# Patient Record
Sex: MALE | Race: WHITE | NOT HISPANIC OR LATINO | Employment: OTHER | URBAN - METROPOLITAN AREA
[De-identification: names, ages, dates, MRNs, and addresses within clinical notes are randomized per-mention and may not be internally consistent; named-entity substitution may affect disease eponyms.]

---

## 2018-08-27 ENCOUNTER — TELEPHONE (OUTPATIENT)
Dept: GASTROENTEROLOGY | Facility: CLINIC | Age: 68
End: 2018-08-27

## 2018-08-27 NOTE — TELEPHONE ENCOUNTER
----- Message from Salma Wallerkert sent at 8/27/2018  9:31 AM CDT -----  Contact: esther/dr lara - [phone]  Brenda - did  You receive the records they sent - please call esther/dr lara - [phone]

## 2018-08-28 ENCOUNTER — TELEPHONE (OUTPATIENT)
Dept: GASTROENTEROLOGY | Facility: CLINIC | Age: 68
End: 2018-08-28

## 2018-08-28 NOTE — TELEPHONE ENCOUNTER
----- Message from Marichuy Bhat sent at 8/28/2018  2:58 PM CDT -----  Contact: [facility] Dr HERMINIA Rivera's cover sheet has been scanned into media mgr.     Thank you for your assistance scheduling appt.     Sincerely,   [facility]   [id number]   ----- Message -----  From: Nicole Soto RN  Sent: 8/22/2018   2:19 PM  To: Marichuy Benedict,    Can you please request that Dr. Garcia's office fill out Dr. David Gutierrez's referral form? In the meantime I will discuss this referral with Dr. Gutierrez.    Thanks!  -Nicole      ----- Message -----  From: Marichuy Bhat  Sent: 8/22/2018  12:57 PM  To: Brenda Hernandez Staff    Hi Dr Gutierrez and  Staff,     Dr PIEDRA. Hu Garcia was referring this pt to Dr SHA Carter for dx Crohns.     Dr Carter states we should redirect this pt to you.     The refrl and records are in media.     Can you plz review and assist with appt?     Thank you,  [facility]  [facility]   [id number]

## 2018-08-31 ENCOUNTER — TELEPHONE (OUTPATIENT)
Dept: GASTROENTEROLOGY | Facility: CLINIC | Age: 68
End: 2018-08-31

## 2018-08-31 NOTE — TELEPHONE ENCOUNTER
----- Message from Cookie Fox sent at 8/31/2018  1:19 PM CDT -----  Contact: Chel Stratton,  [phone]  Patient Returning Call from [facility]    Who Left Message for Patient: Nicole  Communication Preference: Chel Stratton,  [phone]  Additional Information:

## 2018-08-31 NOTE — TELEPHONE ENCOUNTER
----- Message from Yuli Schwartz MA sent at 8/31/2018  3:54 PM CDT -----  Contact: [phone]  Patient Returning Call from [facility]    Who Left Message for Patient: Nicole  Communication Preference: [phone]  Additional Information: regarding appt access with Dr Gutierrez (referral)

## 2018-09-05 ENCOUNTER — TELEPHONE (OUTPATIENT)
Dept: GASTROENTEROLOGY | Facility: CLINIC | Age: 68
End: 2018-09-05

## 2018-09-06 NOTE — TELEPHONE ENCOUNTER
Pt is scheduled for a new patient clinic appt with Dr. ZOILA Gutierrez on 9/10/2018.    E-mail sent to pt with appt reminder, new patient welcome letter as well as a campus map.    Referring MD notified of appt.

## 2018-09-06 NOTE — PROGRESS NOTES
[facility]          Inflammatory Bowel Disease New Patient Consultation Note         TODAY'S VISIT DATE:  9/6/2018    Reason for Consult:    Chief Complaint   Patient presents with    Crohn's Disease     PCP: No primary care provider on file.      Referring MD:   Dr. TED Garcia    History of Present Illness:    Dear. Dr. TED Garcia    Thank you for requesting a consultation on your patient Soy Stratton who is a 68 y.o. male seen today at the [facility] on 09/06/2018 for inflammatory bowel disease- Crohn's disease.  Pertinent past medical history includes family history of Crohn's disease, IBS, GERD, H pylori infection (treated 1999), chronic pain syndrome, WILMA, diverticulosis, depression, fibromyalgia, nephrolithiasis, shingles, asthma (diagnosed 11/2017), Reveal cardiac monitor (placed 10/2016).    As you know, Soy Stratton was doing well until July 1997 when he reported bloating, fatigue, abdominal cramping and loose stool. Levsin and prevacid were not effective GB US 1/1998 was normal.  He then underwent EGD and colonoscopy on 6/2/1999.  EGD report not available but biopsies of the stomach revealed chronic superficial gastritis with rare equivocal H pylori.  Colonoscopy showed sigmoid diverticulosis with remainder of the colon normal (no biposies available).  H pylori treatment seemed to help his symptoms.  At that time he was diagnosed with IBS and prevacid seemed to help GERD symptoms and Paxil helped with IBS symptoms.  In 11/1999 he had nausea/bloating, abdominal cramping. Bentyl was started and helped.  He continued to feel well.  In 7/2008 he was seen for epigastric discomfort and nausea. EGD in 12/2008 was normal.  In Jun 2010 patient had severe abdominal pain and treated with cipro/flagyl for presumed diverticulitis. In 12/2010 he was seen by Dr. Valencia for chronic pain syndrome and was diagnosed with fibromyalgia and given zanaflex.    "In 6/2011 patient reported abdominal pain and indigestion treated with prevacid with no improvement.  EGD and colonoscopy were done 6/29/11 which showed normal EGD and colonoscopy showed normal colon with biopsies of ascending and descending colon normal.  Moderate diverticulosis of the sigmoid and descending colon.  Ulceration in the terminal ileum with biopsies consistent with "moderate to severe severity." At his f/u visit on 7/5/11 after the colonoscopy he was started on pentasa with a plan for VCE and possible biologics in the future. In 8/2011 patient underwent VCE which showed 2 short segments of granularity/erythema visualized in the distal ileum and distal jejunum vs proximal ileum consistent with CD.  In approximately late 10/2011 he was started on budesonide which improved and continued this for 2 additional weeks.  The plan at that visit was to continued pentasa and consider low dose budesonide long term.  In approximately 3/2012 he discontinued budesonide.  He started imuran 100 mg po daily in early 2/2012 and reported constipation/bloating. He again had skin issues treated including seborrheic keratosis and irritated intradermal nevus.  By 2012 symptoms stable on imuran and pentasa though at his visit in 7/2012 his imuran was increased to 150 mg po daily.  In 10/2012 he reported RUQ/right flank pain for 2 weeks and after workup due to biliary dyskinesia underwent a cholecystectomy with improvement of pain and nausea.  He was hopstilized in 2/2013 for about 3 days due to hematochezia and hospitalized in the ICU. He was transfused 2 units of PRBC.  Colonoscopy during this hospitalization revealed sigmoid and descending colon diverticulosis and TI ulcers.  It was felt that the patient had LGIB related to diverticular bleed. At his f/u after hospitalization on 3/7/13 he planned on thiopurine metabolites, stopped pentasa (too expensive), start flagyl.  On 5/7/13 he had kidney stone and at that time notes " indicate he was on levsin and continuing on imuran 200 mg po daily.  In 8/2013 patient was feeling well on imuran.  In 10/2013 he had recurrent RUQ abdominal pain radiating to his flank which started after starting crestor.  In 12/2013 patient developed URI and treated with corticosteroids and ceftin.  In 2/2014 patient had coughing so underwent Cxray which was normal and CT abd/pelvis showing diverticulosis and thickening of the TI.  He also that month complained of joint and muscle pain.  Colonoscopy 3/2014 (endo report not available) showed path with active CD of the small intestine.  Patient started remicade in approximately spring 2014 and remained on this along with imuran for at least a year.  In 9/2014 EGD was essentially except for benign fundic gland polyps. In 3/2015 patient had bronchitis and multiple rounds of antibiotics and had intermittent diarrhea.  Patient had active shingles while on remicade. Due to recurrent infections remicade was discontinued sometime in 2015.  He had some erratic bowels after receiving antibiotics for UTI in 12/2015 and probiotics seemed to help.  In  4/2016 he had nausea and decreased appetite and underwent an EGD which showed diffuse granularity and erythema in the entire stomach with biopsies showing reactive gastropathy and benign fundic gland polyps.  He had therapeutic thiopurine metabolites on imuran.  Colonoscopy in 7/2016 showed multiple irregular ulcers in the terminal ileum with biopsies consistent with acute and chronic inflammation.  In summer 2016 patient was started on cimzia and his imuran by 10/2016 was decreased from 150 mg po daily to 100 mg po daily. On 10/7/16 a Reveal cardiac monitor was placed due to abnormal EP study. By Nov/Dec 2016 he had ongoing nausea/bloating, joint pain.  He was on vitamin B12 supplements, prevacid BID, carafate ineffective and continued on cimzia 400 mg SC monthly and imuran 100 mg po daily. He recalls being on imuran 200 mg  "daily and his cardiologist was convinced the imuran was the issue and so his imuran was decreased to 100 mg daily.   On 2/8/18 he had cimzia levels done which were 8.3 with no abs.  Colonoscopy 2/12/18 showed a single 5 mm ulcer in the TI and few shallow ulcers with biopsies showing severely active chronic enteritis; extensive mucosal ulceration with granulation tissue; numerous non-caseating granulomas present.  At his visit in 3/2018 due to low cimzia levels his GI doctor recommended increasing dose vs switching drug.  In 4/2018 he started entyvio and reported at his visit 6/2018 that he joint aches and stuffy nose though entyvio was continued.  Labs on 6/29/18 showed normal CBC, , normal CMP.     Currently patient reports 1-2 formed BMs/day with no blood, nocturnal bowel movements.  He reports "feverish" at times but no fevers, occasional chills.  He has had 10 pound weight loss in last 4-6 mos and he attributes this to "stomach feels irritated."  He has eye redness bilaterally. He has ROBB due to more strenuous exertion (02 sats normal, no calf pain or swelling, no chest pain). He reports umbilical 3/10 abdominal pain (tender). About 3 weeks had some diarrhea which resolved on its own and lomotil helped. He has had some "abdominal swelling" since then.  He has anxiety/depression.  He has generalized joint pain and back pain. Since starting entyvio in 4/2018 he had some URI and now these symptoms are getting less intense with time. Getting some improvement of energy levels though this has improved with time but he feels this started when he started the entyvio. He is currently on entyvio 300 mg IV every 8 weeks (last dose 7/27/18, next dose 9/21/18) and imuran 100 mg po daily. Patient has no other gastrointestinal/constitutional complaints including no nausea/vomiting (including no hematemesis), dysphagia.  Also patient does not have any extraintestinal manifestations of their inflammatory bowel disease " including no eye pain/redness, skin lesions/rashes, oral ulcers.    Prior Pertinent Surgeries:   3/22/2011: Skin biopsy (right sub scap back: hyperkeratosis, parakeratosis, papillomatosis, acanthosis, and horn cysts; lesion is heavily pigmented--pigmented seborrheic keratosis)  2012 skin biopsy: (irritated intradermal nevus: nested intradermal nevus cells embedded in edematous stroma)    Pertinent Endoscopy/Imagin1998 gallbladder US: negative  1999 EGD: Path only (antrum: focal chronic superficial gastritis with rare equivocal H. pylori organisms)  1999 Colonoscopy: diverticulosis of the sigmoid of the colon with the remainder of the colon appearing normal (no biopsies)  12/15/2008 EGD: normal (no biopsies)  2011 EGD: normal (no biopsies)  2011 Colonoscopy: colonic mucosa reveals normal appearing colonic mucosa throughout the exam, moderate diverticulosis in the sigmoid and descending colon; ulceration in the terminal ileum (Path: Crohns disease, moderate to severe in severity) (path-ascending & descending: normal)  8/3/2011 VCE: 2 short segments of granularity/erythema visualized in the distal ileum and distal jejunum vs proximal ileum consistent with CD  2014 CXR: normal   2014 CT Abd/pelvis: thickening of the terminal ileum; diverticulosis coli  3/10/2014 Colonoscopy: (path: active CD of the small intestine)  9/3/2014 EGD: normal esophagus; protruding lesions, several sessile non-bleeding polyps ranging from 3-5 mm in the fundus consistent with benign fundic gland polyps (path: fundal polyp glands); normal duodenum   2016 EGD: diffuse granularity and erythema of the mucosa in the whole stomach (path: prominent chemical/reactive gastropathy, negative H. pylori);  several non-bleeding polyps ranging from 2mm-1cm in the stomach body and fundus consistent with benign fundic gland polyps (path: benign fundic gland polyp)  2016 Colonoscopy: multiple irregular ulcers  ranging from 2-5mm found in the TI (path: active ileitis with mucosal erosion, no granulomas)  2/12/2018 Colonoscopy: a single deep 5 mm ulcer found in the TI; few shallow ulcers ranging from 1-2 mm in size in the TI; aphthous ulcers SES-CD score of 4 (path: severely active chronic enteritis; extensive mucosal ulceration with granulation tissue; numerous non-caseating granulomas present)    Pertinent Labs:  6/29/2018: WBC 7.7, RBC 4.74, Hgb 16.5, Hct 47.6,  (79-97), Platelets 174, BUN 20, creatinine 1.15, albumin 4.5, t bili 0.9. alk phos 65, AST 28, ALT 24    Therapeutic Drug Monitoring Labs:  2/8/2018 Cimzia level 8.3, no ABs    Prior IBD Therapies:  Pentasa 4 gm/day  Budesonide   Imuran 2/2012  Cipro  Flagyl  Remicade 4066-3644  Cimzia 8/20160215-4726    Current IBD Therapies:  Entyvio 300 mg IV every 8 weeks   Imuran 100 mg po daily    Vaccinations:  Influenza (inactive): Fall 2017  Pneumococcal PCV 13: recommended  Pneumococcal PCV 23: 2013  Tetanus (TdaP): not sure  HPV (males and females ages 19-25 yo): NA   Meningococcal (risk factors-complement component deficiency, spleen damage or splenectomy, HIV, traveling to endemic areas, college student residing in residence [place],  recruits): NA  Hepatitis B:  Not sure  No results found for: HEPBSAB  Hepatitis A (risk factors- traveling to high endemic areas, chronic liver disease, clotting factor disorders, MSM, illicit drug users): Not sure  No results found for: HEPAIGG  MMR (live vaccine): Not sure  Chickenpox status/Varicella (live vaccine): Had chickenpox as a child  No results found for: VARICELLAZOS, VARICELLAINT  Zoster (age >49 yo, live vaccine): Had shingles (2015); Shingrix recommended    NSAID use/indication:  Aleve once a month, aches/pains    Narcotic use:  No    Alternative/Complementary Meds for IBD:  No    Review of Systems   Constitutional: Positive for chills and fever. Negative for weight loss.   Eyes: Positive for blurred vision, pain  and redness.   Respiratory: Positive for shortness of breath. Negative for cough.    Cardiovascular: Negative for chest pain.   Gastrointestinal: Positive for abdominal pain. Negative for heartburn, nausea and vomiting.   Genitourinary: Negative for hematuria.   Musculoskeletal: Positive for back pain and joint pain.   Skin: Negative for rash.   Psychiatric/Behavioral: Positive for depression. The patient is nervous/anxious.      Medical/Surgical History:    has a past medical history of Arrhythmia, Asthma, Cholelithiasis, Chronic pain syndrome, Colon polyp, Crohn's disease, Depression, Diverticulosis, Fibromyalgia, GERD (gastroesophageal reflux disease), H. pylori infection, HTN (hypertension), Irritable bowel syndrome, Nephrolithiasis, WILMA (obstructive sleep apnea), and Shingles.   has a past surgical history that includes Cholecystectomy; Appendectomy; Sinus surgery (2003); Anterior cruciate ligament repair (2005); Carpal tunnel release; Trigger finger release; and Kidney surgery.    Family History:   family history includes Crohn's disease in his brother, mother, and sister; Inflammatory bowel disease in his brother, mother, and sister.    Social History:    reports that  has never smoked. he has never used smokeless tobacco. He reports that he drinks alcohol. He reports that he uses drugs. Drug: Marijuana.    Review of patient's allergies indicates:  Allergies not on file    Current Medications:   Outpatient Medications Marked as Taking for the 9/10/18 encounter (Office Visit) with David Gutierrez MD   Medication Sig Dispense Refill    acetaminophen (TYLENOL) 650 MG TbSR Take 650 mg by mouth every 8 (eight) hours.      albuterol (PROVENTIL) 2.5 mg /3 mL (0.083 %) nebulizer solution Take 2.5 mg by nebulization 2 (two) times daily as needed for Wheezing. Rescue      albuterol sulfate (VENTOLIN INHL) Inhale 2 puffs into the lungs 2 (two) times daily.      ascorbic acid/multivit-min (EMERGEN-C ORAL) Take 3  Packages by mouth once daily.      aspirin (ECOTRIN) 81 MG EC tablet Take 81 mg by mouth once daily.      azaTHIOprine (IMURAN) 50 mg Tab Take 100 mg by mouth once daily.      azelastine 0.15 % (205.5 mcg) Spry 2 sprays by Nasal route 2 (two) times daily.      busPIRone (BUSPAR) 15 MG tablet Take 15 mg by mouth every evening.      busPIRone (BUSPAR) 7.5 MG tablet Take 7.5 mg by mouth every morning.      calcium citrate-vitamin D3 315-200 mg (CITRACAL+D) 315-200 mg-unit per tablet Take 1 tablet by mouth 2 (two) times daily.      cetirizine (ZYRTEC) 10 MG tablet Take 10 mg by mouth once daily.      cyanocobalamin 1,000 mcg/mL injection Inject 1,000 mcg into the muscle every 30 days.      diltiaZEM (TIAZAC) 180 MG Cs24 Take 180 mg by mouth once daily.      diphenoxylate-atropine 2.5-0.025 mg (LOMOTIL) 2.5-0.025 mg per tablet Take 1 tablet by mouth 4 (four) times daily as needed for Diarrhea.      fluticasone (FLONASE) 50 mcg/actuation nasal spray 1 spray by Each Nare route once daily.      fluticasone (FLOVENT HFA) 110 mcg/actuation inhaler Inhale 2 puffs into the lungs 2 (two) times daily. Controller      fluticasone furoate (ARNUITY ELLIPTA) 100 mcg/actuation DsDv Inhale 1 puff into the lungs once daily. Controller      hyoscyamine (ANASPAZ,LEVSIN) 0.125 mg Tab Take 125 mcg by mouth every 4 (four) hours as needed.      ipratropium (ATROVENT) 42 mcg (0.06 %) nasal spray 2 sprays by Nasal route 4 (four) times daily.      Lactobac no.41/Bifidobact no.7 (PROBIOTIC-10 ORAL) Take 1 Dose by mouth once daily.      loratadine (CLARITIN) 5 mg chewable tablet Take 10 mg by mouth once daily.      LORazepam (ATIVAN) 1 MG tablet Take 1 mg by mouth 2 (two) times daily as needed for Anxiety.      losartan (COZAAR) 50 MG tablet Take 50 mg by mouth once daily.      montelukast (SINGULAIR) 10 mg tablet Take 10 mg by mouth once daily.      naproxen sodium (ANAPROX) 220 MG tablet Take 440 mg by mouth 2 (two) times  "daily with meals.      omeprazole (PRILOSEC) 20 MG capsule Take 20 mg by mouth 2 (two) times daily.      PEPPERMINT OIL MISC 1 tablet by Misc.(Non-Drug; Combo Route) route before meals as needed.      senna (SENOKOT) 8.6 mg tablet Take 1 tablet by mouth once daily.      sildenafil (VIAGRA) 25 MG tablet Take 100 mg by mouth daily as needed for Erectile Dysfunction.      SLIPPERY ELM BARK ORAL Take 400 mg by mouth 3 (three) times daily before meals.      testosterone cypionate (DEPOTESTOTERONE CYPIONATE) 100 mg/mL injection Inject 200 mg into the muscle every 14 (fourteen) days.      UNABLE TO FIND Take 1 tablet by mouth 2 (two) times daily. medication name: Fibralgia    Mg Malate 80 mg  Malic Acid 350 mg      vedolizumab (ENTYVIO) 300 mg SolR injection Inject into the vein every 8 weeks.       Vital Signs:  BP (!) 140/84 (BP Location: Left arm, Patient Position: Sitting)   Pulse (!) 58 Comment: O2: 97  Temp 98.3 °F (36.8 °C)   Ht 5' 8" (1.727 m)   Wt 94.4 kg (208 lb 1.8 oz)   BMI 31.64 kg/m²     Physical Exam   Constitutional: He is oriented to person, place, and time. He appears well-developed.   HENT:   Mouth/Throat: Oropharynx is clear and moist. No oral lesions.   Eyes: Conjunctivae are normal. Pupils are equal, round, and reactive to light.   Cardiovascular: Normal rate and regular rhythm.   Pulmonary/Chest: Effort normal and breath sounds normal.   Abdominal: Soft. There is no tenderness.   Neurological: He is alert and oriented to person, place, and time.   Skin: No rash noted.   Young facial appearance   Psychiatric: He has a normal mood and affect.   Nursing note and vitals reviewed.    Labs: reviewed and pertinent noted above    Assessment/Plan:  Soy Stratton is a 68 y.o. male with ileal Crohn's disease, fam hx of CD,  IBS, GERD, H pylori infection (treated 1999), chronic pain syndrome, WILMA, diverticulosis, depression, fibromyalgia (dxed by Dr. Valencia in 12/2010- zanaflex given), " "nephrolithiasis, history of shingles, asthma (diagnosed 11/2017), "Reveal" cardiac monitor (placed 10/2016) due to abnl EP study.  He began with GI symptoms in 1997 treated with levsin and prevacid which were ineffective.  Workup between 3500-3412 included normal GB US, EGD with H pylori positive (treated) and colonoscopy showed TI ulcerations and sigmoid diverticulosis. He was treated for IBS with bentyl which helped until 6/2010 when she had severe abd pain and treated with abx for presumed diverticulitis.  In 7/2011 pt started on pentasa and had VCE 8/2011 showing 2 short segments of granularity/erythema in distal ileum and distal jejunum vs proximal ileum.  He took budesonide from 10/2011 to 3/2012 which improved his symptoms and started imuran 100 mg in 2/2012 which was increased to 150 mg in 7/2012.  He reported RUQ/flank pain with nausea in 10/2012 and after a w/u underwent a cholecystectomy due to bilary dyskinesia which improved his symptoms.  He had an ICU admission in 2/2013 due to suspected diverticular bleeding and colonoscopy during this admission confirmed diverticulosis and TI ulcerations.   Pentasa was discontinued because it was too expensive so he continued on imuran 200 mg and levsin.  CT 2/2014 showed continued diverticulosis and thickening of the TI and a colonoscopy path in 3/2014 (report not available) showed continued active Crohn's disease.  He then started remicade in approximately Spring 2014 and continued on imuran.  After starting remicade, he began to have multiple episodes of bronchitis that required antibiotics and he also had shingles so remicade was discontinued in 2015 while he continued on imuran monotherapy and had therapeutic metabolites on imuran. Colonoscopy 7/2016 showed continued active inflammation so he was started on cimzia in summer 2016 and continued on imuran 150 mg.  Imuran was decreased to 100 mg PO daily in 10/2016 by the cardiologist per the patient due to " "concerns that the abnormal EP study was due to this.  At that time a "Reveal cardiac monitor."   On 2/8/2018 Cimzia levels were low at 8.3 with no abs.  Colonoscopy on 2/12/2018 showed continued active inflammation in the terminal ileum.  Due to ongoing active disease and low cimzia levels, he was switched to entyvio and received his first dose approximately 4/2018.  Since receiving entyvio, he reported joint aching and stuffy nose though these symptoms are improving.  His last entyvio dose was on 7/27/2018 with his next due on 9/21/2018 and he continues on imuran 100 mg PO daily.  Currently he reports 1-2 formed BMs/day with no blood and no nocturnal BMs/day.      Patient had ongoing ileal inflammation while on cimzia though low cimzia levels.  Instead of cimzia optimization the patient was started on entyvio. Initially after entyvio he felt that he had SE of fatigue, sinus infections though this has improved with time. I reassured him that unless this continues and not improving then I would hesitate to give up on entyvio and that entyvio does take time to work. Furthermore the imuran can also contribute to viral infections. If colonoscopy and MRE are normal after 6 mos of entyvio in approximately 10/2018 then he may be able to discontinue imuran and use entyvio monotherapy. Also today we will do thiopurine metabolites and see if the levels are doing anything.  Higher doses may be used as dual therapy but overall entyvio has low risk of immunogenicity so unlikely that it is helping with boosting entyvio levels.  If entyvio is ineffective and there is ongoing disease activity with presumed SE to entyvio improved then I would get entyvio trough levels and see if the drug needs to be optimized to every 4 weeks instead.  We will do labs today and defer our recommendations to Dr. Verde.     # Ileal Crohn's Disease  - I had a long discussion with patient regarding epidemiology, potential etiologies, associations and " triggers (avoiding NSAIDS, using antibiotics with caution,stress and smoking effects on disease state), diagnosis, management goals and treatment options   - continue entyvio 300 mg IV every 8 weeks  - MRE and colonoscopy 10/2018- 6 mos after start of entyvio to evaluate for treatment response  - reassured patient regarding some initial possible SE to entyvio including fatigue, recurrent sinus infections- these have now abated though if continued entyvio may need to be reevaluated  - continue imuran 100 mg po daily- if colonoscopy shows entyvio response then imuran may be discontinued in future if we feel it is not helping; likely not therapeutic based on weight and low risk of immunogenicity with entyvio  - basic labs: CBC, CMP, ESR, CRP, vitamin B12, HCV Ab, HIV (unable to order due to insurance), thyroid testing (unable to order due to insurance), celiac Ab testing   - drug monitoring labs: TPMT, TB quantiferon, Hep B testing (HBsAg, HBtotalcoreAb)  - other drug monitoring:  thiopurine metabolites today, consider entyvio levels if ongoing active disease and do reactively given possible SE of entyvio    # Upper abdominal pain  - consider EGD at time of colonoscopy in 10/2018    # History of H pylori infection:   - treated 1999  - 4/2016 EGD H pylori biopsies negative     #Fibromyalgia  - diagnosed by outside MD  - if truly a diagnosis- suggested to patient that cymbalta may help with this and abdominal pain    # IBD specific health maintenance:  Colorectal cancer risk:    Risks factors: none-average risk  - Distribution of colonic disease:  ileal  - Year of symptom onset: 1997  - colonoscopy: average risk      Opthamologic exam recommended yearly  Dermatologic exam recommended yearly    Bone health:  Risk of osteopenia/osteoporosis:  Risks factors: none  Vitamin D: vitamin D level ordered today    Vaccine counseling:  - No LIVE VACCINES- reminded in clinic  - VZV IgG, HAV IgG, HBsAb today   - referral to  Infectious Disease clinic to get up to date on vaccinations    Follow up: with Dr. Garcia in 4 weeks    Thank you again for sending Soy Stratton to see Dr. David Gutierrez today at the [facility]. Please don't hesitate to contact Dr. Gutierrez if there are any questions regarding this evaluation, or if you have any other patients with inflammatory bowel disease for whom you would like a consultation. You can reach Dr. Gutierrez at [phone] or by email at [email or web address]    History, physical exam, assessment and plan were performed by me personally. NP, Valencia Olivera was present during entire visit     David Gutierrez MD  Department of Gastroenterology  Medical Director, Inflammatory Bowel Disease

## 2018-09-10 ENCOUNTER — LAB VISIT (OUTPATIENT)
Dept: LAB | Facility: HOSPITAL | Age: 68
End: 2018-09-10
Attending: INTERNAL MEDICINE
Payer: MEDICARE

## 2018-09-10 ENCOUNTER — OFFICE VISIT (OUTPATIENT)
Dept: GASTROENTEROLOGY | Facility: CLINIC | Age: 68
End: 2018-09-10
Payer: MEDICARE

## 2018-09-10 VITALS
HEIGHT: 68 IN | TEMPERATURE: 98 F | DIASTOLIC BLOOD PRESSURE: 84 MMHG | WEIGHT: 208.13 LBS | BODY MASS INDEX: 31.54 KG/M2 | SYSTOLIC BLOOD PRESSURE: 140 MMHG | HEART RATE: 58 BPM

## 2018-09-10 DIAGNOSIS — Z86.19 HISTORY OF HELICOBACTER PYLORI INFECTION: ICD-10-CM

## 2018-09-10 DIAGNOSIS — Z79.899 HIGH RISK MEDICATION USE: ICD-10-CM

## 2018-09-10 DIAGNOSIS — K50.00 CROHN'S DISEASE OF SMALL INTESTINE WITHOUT COMPLICATION: ICD-10-CM

## 2018-09-10 DIAGNOSIS — M79.7 FIBROMYALGIA: ICD-10-CM

## 2018-09-10 DIAGNOSIS — K50.00 CROHN'S DISEASE OF SMALL INTESTINE WITHOUT COMPLICATION: Primary | ICD-10-CM

## 2018-09-10 DIAGNOSIS — R10.10 UPPER ABDOMINAL PAIN: ICD-10-CM

## 2018-09-10 LAB
25(OH)D3+25(OH)D2 SERPL-MCNC: 56 NG/ML
ALBUMIN SERPL BCP-MCNC: 4.4 G/DL
ALP SERPL-CCNC: 72 U/L
ALT SERPL W/O P-5'-P-CCNC: 16 U/L
ANION GAP SERPL CALC-SCNC: 7 MMOL/L
AST SERPL-CCNC: 22 U/L
BASOPHILS # BLD AUTO: 0.04 K/UL
BASOPHILS NFR BLD: 0.5 %
BILIRUB SERPL-MCNC: 1.3 MG/DL
BUN SERPL-MCNC: 17 MG/DL
CALCIUM SERPL-MCNC: 9.9 MG/DL
CHLORIDE SERPL-SCNC: 105 MMOL/L
CO2 SERPL-SCNC: 24 MMOL/L
CREAT SERPL-MCNC: 1.1 MG/DL
CRP SERPL-MCNC: 11.9 MG/L
DIFFERENTIAL METHOD: ABNORMAL
EOSINOPHIL # BLD AUTO: 0 K/UL
EOSINOPHIL NFR BLD: 0.5 %
ERYTHROCYTE [DISTWIDTH] IN BLOOD BY AUTOMATED COUNT: 13.6 %
ERYTHROCYTE [SEDIMENTATION RATE] IN BLOOD BY WESTERGREN METHOD: 7 MM/HR
EST. GFR  (AFRICAN AMERICAN): >60 ML/MIN/1.73 M^2
EST. GFR  (NON AFRICAN AMERICAN): >60 ML/MIN/1.73 M^2
GLUCOSE SERPL-MCNC: 98 MG/DL
HBV CORE AB SERPL QL IA: NEGATIVE
HBV SURFACE AB SER-ACNC: NEGATIVE M[IU]/ML
HBV SURFACE AG SERPL QL IA: NEGATIVE
HCT VFR BLD AUTO: 48.7 %
HCV AB SERPL QL IA: NEGATIVE
HEPATITIS A ANTIBODY, IGG: NEGATIVE
HGB BLD-MCNC: 16 G/DL
IMM GRANULOCYTES # BLD AUTO: 0.08 K/UL
IMM GRANULOCYTES NFR BLD AUTO: 1 %
LYMPHOCYTES # BLD AUTO: 1.2 K/UL
LYMPHOCYTES NFR BLD: 14.1 %
MCH RBC QN AUTO: 34.9 PG
MCHC RBC AUTO-ENTMCNC: 32.9 G/DL
MCV RBC AUTO: 106 FL
MONOCYTES # BLD AUTO: 0.7 K/UL
MONOCYTES NFR BLD: 8.1 %
NEUTROPHILS # BLD AUTO: 6.3 K/UL
NEUTROPHILS NFR BLD: 75.8 %
NRBC BLD-RTO: 0 /100 WBC
PLATELET # BLD AUTO: 162 K/UL
PMV BLD AUTO: 11.8 FL
POTASSIUM SERPL-SCNC: 4.2 MMOL/L
PROT SERPL-MCNC: 8.3 G/DL
RBC # BLD AUTO: 4.58 M/UL
SODIUM SERPL-SCNC: 136 MMOL/L
VIT B12 SERPL-MCNC: 732 PG/ML
WBC # BLD AUTO: 8.29 K/UL

## 2018-09-10 PROCEDURE — 83516 IMMUNOASSAY NONANTIBODY: CPT

## 2018-09-10 PROCEDURE — 99215 OFFICE O/P EST HI 40 MIN: CPT | Mod: PBBFAC | Performed by: INTERNAL MEDICINE

## 2018-09-10 PROCEDURE — 86803 HEPATITIS C AB TEST: CPT

## 2018-09-10 PROCEDURE — 99999 PR PBB SHADOW E&M-EST. PATIENT-LVL V: CPT | Mod: PBBFAC,,, | Performed by: INTERNAL MEDICINE

## 2018-09-10 PROCEDURE — 86704 HEP B CORE ANTIBODY TOTAL: CPT

## 2018-09-10 PROCEDURE — 85652 RBC SED RATE AUTOMATED: CPT

## 2018-09-10 PROCEDURE — 86140 C-REACTIVE PROTEIN: CPT

## 2018-09-10 PROCEDURE — 82607 VITAMIN B-12: CPT

## 2018-09-10 PROCEDURE — 85025 COMPLETE CBC W/AUTO DIFF WBC: CPT

## 2018-09-10 PROCEDURE — 86787 VARICELLA-ZOSTER ANTIBODY: CPT

## 2018-09-10 PROCEDURE — 82542 COL CHROMOTOGRAPHY QUAL/QUAN: CPT

## 2018-09-10 PROCEDURE — 99205 OFFICE O/P NEW HI 60 MIN: CPT | Mod: S$PBB,,, | Performed by: INTERNAL MEDICINE

## 2018-09-10 PROCEDURE — 82306 VITAMIN D 25 HYDROXY: CPT

## 2018-09-10 PROCEDURE — 86790 VIRUS ANTIBODY NOS: CPT

## 2018-09-10 PROCEDURE — 82657 ENZYME CELL ACTIVITY: CPT

## 2018-09-10 PROCEDURE — 87340 HEPATITIS B SURFACE AG IA: CPT

## 2018-09-10 PROCEDURE — 86706 HEP B SURFACE ANTIBODY: CPT

## 2018-09-10 PROCEDURE — 80053 COMPREHEN METABOLIC PANEL: CPT

## 2018-09-10 RX ORDER — DILTIAZEM HYDROCHLORIDE 180 MG/1
180 CAPSULE, EXTENDED RELEASE ORAL DAILY
COMMUNITY

## 2018-09-10 RX ORDER — LORAZEPAM 1 MG/1
1 TABLET ORAL 2 TIMES DAILY PRN
COMMUNITY

## 2018-09-10 RX ORDER — LANOLIN ALCOHOL/MO/W.PET/CERES
1 CREAM (GRAM) TOPICAL 2 TIMES DAILY
COMMUNITY

## 2018-09-10 RX ORDER — LOSARTAN POTASSIUM 50 MG/1
50 TABLET ORAL DAILY
COMMUNITY

## 2018-09-10 RX ORDER — OMEPRAZOLE 20 MG/1
20 CAPSULE, DELAYED RELEASE ORAL 2 TIMES DAILY
COMMUNITY

## 2018-09-10 RX ORDER — ALBUTEROL SULFATE 0.83 MG/ML
2.5 SOLUTION RESPIRATORY (INHALATION) 2 TIMES DAILY PRN
COMMUNITY

## 2018-09-10 RX ORDER — SILDENAFIL 25 MG/1
100 TABLET, FILM COATED ORAL DAILY PRN
COMMUNITY

## 2018-09-10 RX ORDER — TESTOSTERONE CYPIONATE 1000 MG/10ML
200 INJECTION, SOLUTION INTRAMUSCULAR
COMMUNITY

## 2018-09-10 RX ORDER — MONTELUKAST SODIUM 10 MG/1
10 TABLET ORAL DAILY
COMMUNITY

## 2018-09-10 RX ORDER — NAPROXEN SODIUM 220 MG
440 TABLET ORAL 2 TIMES DAILY WITH MEALS
COMMUNITY

## 2018-09-10 RX ORDER — BUSPIRONE HYDROCHLORIDE 15 MG/1
15 TABLET ORAL NIGHTLY
COMMUNITY

## 2018-09-10 RX ORDER — AZELASTINE HCL 205.5 UG/1
2 SPRAY NASAL 2 TIMES DAILY
COMMUNITY

## 2018-09-10 RX ORDER — FLUTICASONE PROPIONATE 110 UG/1
2 AEROSOL, METERED RESPIRATORY (INHALATION) 2 TIMES DAILY
COMMUNITY

## 2018-09-10 RX ORDER — SENNOSIDES 8.6 MG/1
1 TABLET ORAL DAILY
COMMUNITY

## 2018-09-10 RX ORDER — DIPHENOXYLATE HYDROCHLORIDE AND ATROPINE SULFATE 2.5; .025 MG/1; MG/1
1 TABLET ORAL 4 TIMES DAILY PRN
COMMUNITY

## 2018-09-10 RX ORDER — DEXTROMETHORPHAN HYDROBROMIDE, GUAIFENESIN 5; 100 MG/5ML; MG/5ML
650 LIQUID ORAL EVERY 8 HOURS
COMMUNITY

## 2018-09-10 RX ORDER — FLUTICASONE PROPIONATE 50 MCG
1 SPRAY, SUSPENSION (ML) NASAL DAILY
COMMUNITY

## 2018-09-10 RX ORDER — IPRATROPIUM BROMIDE 42 UG/1
2 SPRAY, METERED NASAL 4 TIMES DAILY
COMMUNITY

## 2018-09-10 RX ORDER — ASPIRIN 81 MG/1
81 TABLET ORAL DAILY
COMMUNITY

## 2018-09-10 RX ORDER — AZATHIOPRINE 50 MG/1
100 TABLET ORAL DAILY
COMMUNITY

## 2018-09-10 RX ORDER — CETIRIZINE HYDROCHLORIDE 10 MG/1
10 TABLET ORAL DAILY
COMMUNITY

## 2018-09-10 RX ORDER — BUSPIRONE HYDROCHLORIDE 7.5 MG/1
7.5 TABLET ORAL EVERY MORNING
COMMUNITY

## 2018-09-10 RX ORDER — HYOSCYAMINE SULFATE 0.125 MG
125 TABLET ORAL EVERY 4 HOURS PRN
COMMUNITY

## 2018-09-10 RX ORDER — CYANOCOBALAMIN 1000 UG/ML
1000 INJECTION, SOLUTION INTRAMUSCULAR; SUBCUTANEOUS
COMMUNITY

## 2018-09-10 NOTE — LETTER
September 10, 2018      TED Garcia MD  [address]  [facility]  [place] [address]           [facility] - Gastroenterology  [address]  [facility] [address]  Phone: [phone]  Fax: [phone]          Patient: Soy Stratton   MR Number: [id number]   YOB: 1950   Date of Visit: 9/10/2018       Dear Dr. TED Garcia:    Thank you for referring Soy Stratton to me for evaluation. Attached you will find relevant portions of my assessment and plan of care.    If you have questions, please do not hesitate to call me. I look forward to following Soy Stratton along with you.    Sincerely,    David Gutierrez MD    Enclosure  CC:  No Recipients    If you would like to receive this communication electronically, please contact [email or web address] or [phone] to request more information on [facility] access.    For providers and/or their staff who would like to refer a patient to [facility], please contact us through our one-stop-shop provider referral line, [facility], at [phone].    If you feel you have received this communication in error or would no longer like to receive these types of communications, please e-mail [email or web address]

## 2018-09-10 NOTE — PATIENT INSTRUCTIONS
Instructions:  - labs today  - colonoscopy 6 months after entyvio, 10/2018  - small bowel imaging recommended in 10/2018; MRE, CTE, SBFT, or VCE  - studies recommended any time you have diarrhea  - EGD recommended at same time as colonoscopy  - continue entyvio every 8 weeks, due 9/21/2018  - continue Imuran 100 mg PO daily   - discuss cymbalta with PCP for fibromyalgia  - if ongoing active disease, recommend trough Entyvio levels prior to an entyvio infusion and if level is low increasing entyvio to every 4 weeks   - Avoid all NSAIDs (Advil, Ibuprofen, Motrin, Aspirin, Naprosyn, Aleve)  - Yearly Eye exam  - Yearly Skin exam--wear sun block and hats  - Use antibiotics with caution  - For Dental Procedures, use antibiotics only if absolutely necessary  - If you have to take antibiotics for any infection, please take a 2 week course of OTC Florastor 1 capsule twice daily probiotic after completion of the antibiotic course  - Vaccines recommended:  Flu shot yearly  Tetanus every 10 years  Hepatitis A series  Hepatitis B series   Pneumonia 13 (PCV13) vaccine initial   Pneumonia 23 (PPSV23) vaccine 2nd dose due now  Shingrix series (Shingles vaccine)  - Follow up with Dr. Garcia in 4 weeks    - NO LIVE vaccines during therapy or 8 weeks prior to therapy  - Live Vaccines Include:   --Intranasal Influenza A/B  --Measles, Mumps, Rubella (MMR)  --Rotavirus  --Typhoid (oral)  --Vaccina (Smallpox)  --Varicella (Chicken Pox)  --Yellow Fever  --Zoster

## 2018-09-12 LAB
TTG IGA SER IA-ACNC: 7 UNITS
VARICELLA INTERPRETATION: POSITIVE
VARICELLA ZOSTER IGG: 4.51 ISR

## 2018-09-14 ENCOUNTER — TELEPHONE (OUTPATIENT)
Dept: GASTROENTEROLOGY | Facility: CLINIC | Age: 68
End: 2018-09-14

## 2018-09-14 LAB
6-METHYLMERCAPTOPURINE RIBOSIDE: 5.14 NMOL/ML/H (ref 5.04–9.57)
6-METHYLMERCAPTOPURINE: 4.13 NMOL/ML/H (ref 3–6.66)
6-METHYLTHIOGUANINE RIBOSIDE: 3.26 NMOL/ML/H (ref 2.7–5.84)
TPMT INTERPRETATION: NORMAL
TPMT REVIEWED BY: NORMAL

## 2018-09-14 NOTE — TELEPHONE ENCOUNTER
Faxed Clinic note, phone note, and labs to Dr Garcia's office [phone]  I wrote on cover sheet to please call and confirm receipt

## 2018-09-14 NOTE — TELEPHONE ENCOUNTER
Spoke to patient re labs results.  Answered all questions.  TPMT and Thiopurine metabolites pending.  Will send over results to Dr. Garcia's office.  Patient verbalizes understanding and agrees with the treatment plan.  Questions answered.    KG

## 2018-10-10 NOTE — TELEPHONE ENCOUNTER
Called and spoke with a Lady in Dr Garcia's office.  (did not catch her name after asking twice)   She stated they received the office notes but not the labs or the phone note   I re-faxed the labs and the phone and underlined on cover sheet to call to confirm receipt

## 2018-10-25 LAB — PROMETHEUS THIOPURINE METABOLITES: NORMAL

## 2018-10-26 ENCOUNTER — TELEPHONE (OUTPATIENT)
Dept: GASTROENTEROLOGY | Facility: CLINIC | Age: 68
End: 2018-10-26

## 2018-10-26 NOTE — TELEPHONE ENCOUNTER
Faxed lab results to Dr Garcia's office at [phone] and asked them to call me to confirm receipt

## 2018-10-26 NOTE — TELEPHONE ENCOUNTER
----- Message from David Gutierrez MD sent at 10/26/2018 10:15 AM CDT -----  Please fax TPMT results and results of this thiopurine metabolites to Dr. Garcia's office    SS  ----- Message -----  From: Uziel, [facility] Interface  Sent: 9/10/2018   1:36 PM  To: David Gutierrez MD